# Patient Record
Sex: MALE | URBAN - METROPOLITAN AREA
[De-identification: names, ages, dates, MRNs, and addresses within clinical notes are randomized per-mention and may not be internally consistent; named-entity substitution may affect disease eponyms.]

---

## 2019-05-22 ENCOUNTER — OFFICE VISIT (OUTPATIENT)
Dept: FAMILY MEDICINE CLINIC | Age: 54
End: 2019-05-22

## 2019-05-22 VITALS
SYSTOLIC BLOOD PRESSURE: 106 MMHG | HEART RATE: 94 BPM | HEIGHT: 68 IN | OXYGEN SATURATION: 98 % | RESPIRATION RATE: 18 BRPM | DIASTOLIC BLOOD PRESSURE: 63 MMHG | BODY MASS INDEX: 29.37 KG/M2 | WEIGHT: 193.8 LBS | TEMPERATURE: 97.4 F

## 2019-05-22 DIAGNOSIS — Z13.1 DIABETES MELLITUS SCREENING: ICD-10-CM

## 2019-05-22 DIAGNOSIS — F64.9 GENDER DYSPHORIA: ICD-10-CM

## 2019-05-22 DIAGNOSIS — I10 ESSENTIAL HYPERTENSION: Primary | ICD-10-CM

## 2019-05-22 DIAGNOSIS — E55.9 VITAMIN D DEFICIENCY: ICD-10-CM

## 2019-05-22 DIAGNOSIS — B20 HIV INFECTION, UNSPECIFIED SYMPTOM STATUS (HCC): ICD-10-CM

## 2019-05-22 DIAGNOSIS — Z13.220 LIPID SCREENING: ICD-10-CM

## 2019-05-22 RX ORDER — ESTRADIOL 2 MG/1
TABLET ORAL
Qty: 90 TAB | Refills: 0 | Status: SHIPPED | OUTPATIENT
Start: 2019-05-22

## 2019-05-22 RX ORDER — ESTRADIOL 2 MG/1
TABLET ORAL
Refills: 1 | COMMUNITY
Start: 2019-03-26 | End: 2019-05-22 | Stop reason: SDUPTHER

## 2019-05-22 RX ORDER — LISINOPRIL 40 MG/1
TABLET ORAL
Refills: 1 | COMMUNITY
Start: 2019-02-24 | End: 2019-05-22 | Stop reason: SDUPTHER

## 2019-05-22 RX ORDER — ACETAMINOPHEN 500 MG
TABLET ORAL 2 TIMES DAILY
COMMUNITY

## 2019-05-22 RX ORDER — SPIRONOLACTONE 100 MG/1
TABLET, FILM COATED ORAL
Refills: 1 | COMMUNITY
Start: 2019-02-24 | End: 2019-05-22 | Stop reason: SDUPTHER

## 2019-05-22 RX ORDER — LISINOPRIL 40 MG/1
TABLET ORAL
Qty: 90 TAB | Refills: 1 | Status: SHIPPED | OUTPATIENT
Start: 2019-05-22

## 2019-05-22 RX ORDER — SPIRONOLACTONE 100 MG/1
TABLET, FILM COATED ORAL
Qty: 90 TAB | Refills: 0 | Status: SHIPPED | OUTPATIENT
Start: 2019-05-22 | End: 2019-09-20 | Stop reason: SDUPTHER

## 2019-05-22 NOTE — PROGRESS NOTES
HPI:  52 y.o.  presents as new patient to establish care. She was previously seeing Aly Griffith at [facility] since 2000. She just received Medicaid and was assigned to our practice for PCP. HIV - on therapy since 2000 treated by MADHAV Griffith at [facility]    HTN - compliant with medication, lisinopril 40 mg and spironolactone 100 mg,  no home monitoring. No history of heart disease or stroke. No chest pain, no shortness of breath, no headaches, no lower extremity swelling. Gender dysphoria - biological male, has felt gender dysphoria since age 7 and living as female since age 13, on estradiol 6 mg daily since 1998, may consider gender surgery if can afford it in the future but is currently happy    She hopes to start working at [employer] part-time. She lives with a roommate. She works as an entertainer. She goes to [email or web address]. Smokes 2-3 cigarettes daily. Patient Active Problem List    Diagnosis    HIV infection ([facility]    Vitamin D deficiency    Hypertension    Gender dysphoria         Past Medical History:   Diagnosis Date    Gender dysphoria 1998    [facility] 5/22/2019    Hypertension     Vitamin D deficiency 5/22/2019       Social History     Tobacco Use    Smoking status: Light Tobacco Smoker     Packs/day: 0.25     Years: 15.00     Pack years: 3.75    Smokeless tobacco: Current User    Tobacco comment: vape user   Substance Use Topics    Alcohol use: Not Currently    Drug use: Never       Outpatient Medications Marked as Taking for the 5/22/19 encounter (Office Visit) with Pat Galvan PA-C   Medication Sig Dispense Refill    estradiol (ESTRACE) 2 mg tablet TAKE 3 TABLETS BY MOUTH EVERY DAY  1    lisinopril (PRINIVIL, ZESTRIL) 40 mg tablet TAKE 1 TABLET BY MOUTH EVERY DAY  1    spironolactone (ALDACTONE) 100 mg tablet TAKE 3 TABLETS BY MOUTH DAILY  1    cholecalciferol (VITAMIN D3) 2,000 unit cap capsule Take  by mouth two (2) times a day. No Known Allergies    ROS:  ROS negative except as per HPI. PE:  Visit Vitals  /63 (BP 1 Location: Right arm, BP Patient Position: Sitting)   Pulse 94   Temp 97.4 °F (36.3 °C) (Oral)   Resp 18   Ht 5' 7.8\" (1.722 m)   Wt 193 lb 12.8 oz (87.9 kg)   SpO2 98%   BMI 29.64 kg/m²     Gen: alert, oriented, no acute distress  Head: normocephalic, atraumatic  Ears: external auditory canals clear, TMs without erythema or effusion  Eyes: pupils equal round reactive to light, sclera clear, conjunctiva clear  Oral: moist mucus membranes, no oral lesions, no pharyngeal inflammation or exudate, full dentures  Neck: symmetric normal sized thyroid, no carotid bruits, no lymphadenopathy  Resp: no increase work of breathing, lungs clear to ausculation bilaterally, no wheezing, rales or rhonchi  CV: S1, S2 normal.  No murmurs, rubs, or gallops. Chest wall: breasts present   Abd: soft, not tender, not distended. No hepatosplenomegaly. Normal bowel sounds. Neuro: not focal  Skin: facial hair  Extremities: no cyanosis or edema    No results found for this visit on 05/22/19. Assessment/Plan:      ICD-10-CM ICD-9-CM    1. Essential hypertension - well controlled. Continue current medication. Exercise, and low salt/ low caffeine diet were discussed. fasting labs pending. F/u 3 mos. W42 481.0 METABOLIC PANEL, COMPREHENSIVE      CBC WITH AUTOMATED DIFF      TSH 3RD GENERATION      lisinopril (PRINIVIL, ZESTRIL) 40 mg tablet   2. HIV infection, unspecified symptom status ([facility] - on therapy since 2000, followed at [facility], referral given   B20 V08 REFERRAL TO INFECTIOUS DISEASE   3. Gender dysphoria - on estradiol and spironolactone since 1998, followed by [facility], refills for 1 month and referral given; if unable to continue with [facility], then gave information for the [facility]. F64.9 302.6 estradiol (ESTRACE) 2 mg tablet      spironolactone (ALDACTONE) 100 mg tablet   4.  Vitamin D deficiency - on Vit D3 2000 units BID E55.9 268.9 VITAMIN D, 25 HYDROXY   5. Diabetes mellitus screening Z13.1 V77.1 HEMOGLOBIN A1C WITH EAG   6. Lipid screening Z13.220 V77.91 LIPID PANEL     Health Maintenance reviewed - updated. Medications Discontinued During This Encounter   Medication Reason    estradiol (ESTRACE) 2 mg tablet Reorder    lisinopril (PRINIVIL, ZESTRIL) 40 mg tablet Reorder    spironolactone (ALDACTONE) 100 mg tablet Reorder       Current Outpatient Medications   Medication Sig Dispense Refill    cholecalciferol (VITAMIN D3) 2,000 unit cap capsule Take  by mouth two (2) times a day.  estradiol (ESTRACE) 2 mg tablet TAKE 3 TABLETS BY MOUTH EVERY DAY 90 Tab 0    lisinopril (PRINIVIL, ZESTRIL) 40 mg tablet TAKE 1 TABLET BY MOUTH EVERY DAY 90 Tab 1    spironolactone (ALDACTONE) 100 mg tablet TAKE 3 TABLETS BY MOUTH DAILY 90 Tab 0       Recommended healthy diet low in carbohydrates, fats, sodium and cholesterol. Recommended regular cardiovascular exercise 3-6 times per week for 30-60 minutes daily. Verbal and written instructions (see AVS) provided. Patient expresses understanding of diagnosis and treatment plan. Follow-up and Dispositions    · Return in about 3 months (around 8/22/2019).

## 2019-05-22 NOTE — PATIENT INSTRUCTIONS
Call and Email:  [phone]  [phone]  [email or web address]         DASH Diet: Care Instructions  Your Care Instructions    The DASH diet is an eating plan that can help lower your blood pressure. DASH stands for Dietary Approaches to Stop Hypertension. Hypertension is high blood pressure. The DASH diet focuses on eating foods that are high in calcium, potassium, and magnesium. These nutrients can lower blood pressure. The foods that are highest in these nutrients are fruits, vegetables, low-fat dairy products, nuts, seeds, and legumes. But taking calcium, potassium, and magnesium supplements instead of eating foods that are high in those nutrients does not have the same effect. The DASH diet also includes whole grains, fish, and poultry. The DASH diet is one of several lifestyle changes your doctor may recommend to lower your high blood pressure. Your doctor may also want you to decrease the amount of sodium in your diet. Lowering sodium while following the DASH diet can lower blood pressure even further than just the DASH diet alone. Follow-up care is a key part of your treatment and safety. Be sure to make and go to all appointments, and call your doctor if you are having problems. It's also a good idea to know your test results and keep a list of the medicines you take. How can you care for yourself at home? Following the DASH diet  · Eat 4 to 5 servings of fruit each day. A serving is 1 medium-sized piece of fruit, ½ cup chopped or canned fruit, 1/4 cup dried fruit, or 4 ounces (½ cup) of fruit juice. Choose fruit more often than fruit juice. · Eat 4 to 5 servings of vegetables each day. A serving is 1 cup of lettuce or raw leafy vegetables, ½ cup of chopped or cooked vegetables, or 4 ounces (½ cup) of vegetable juice. Choose vegetables more often than vegetable juice. · Get 2 to 3 servings of low-fat and fat-free dairy each day.  A serving is 8 ounces of milk, 1 cup of yogurt, or 1 ½ ounces of cheese. · Eat 6 to 8 servings of grains each day. A serving is 1 slice of bread, 1 ounce of dry cereal, or ½ cup of cooked rice, pasta, or cooked cereal. Try to choose whole-grain products as much as possible. · Limit lean meat, poultry, and fish to 2 servings each day. A serving is 3 ounces, about the size of a deck of cards. · Eat 4 to 5 servings of nuts, seeds, and legumes (cooked dried beans, lentils, and split peas) each week. A serving is 1/3 cup of nuts, 2 tablespoons of seeds, or ½ cup of cooked beans or peas. · Limit fats and oils to 2 to 3 servings each day. A serving is 1 teaspoon of vegetable oil or 2 tablespoons of salad dressing. · Limit sweets and added sugars to 5 servings or less a week. A serving is 1 tablespoon jelly or jam, ½ cup sorbet, or 1 cup of lemonade. · Eat less than 2,300 milligrams (mg) of sodium a day. If you limit your sodium to 1,500 mg a day, you can lower your blood pressure even more. Tips for success  · Start small. Do not try to make dramatic changes to your diet all at once. You might feel that you are missing out on your favorite foods and then be more likely to not follow the plan. Make small changes, and stick with them. Once those changes become habit, add a few more changes. · Try some of the following:  ? Make it a goal to eat a fruit or vegetable at every meal and at snacks. This will make it easy to get the recommended amount of fruits and vegetables each day. ? Try yogurt topped with fruit and nuts for a snack or healthy dessert. ? Add lettuce, tomato, cucumber, and onion to sandwiches. ? Combine a ready-made pizza crust with low-fat mozzarella cheese and lots of vegetable toppings. Try using tomatoes, squash, spinach, broccoli, carrots, cauliflower, and onions. ? Have a variety of cut-up vegetables with a low-fat dip as an appetizer instead of chips and dip. ? Sprinkle sunflower seeds or chopped almonds over salads.  Or try adding chopped walnuts or almonds to cooked vegetables. ? Try some vegetarian meals using beans and peas. Add garbanzo or kidney beans to salads. Make burritos and tacos with mashed jiang beans or black beans. Where can you learn more? Go to http://francis-keren.info/. Enter C542 in the search box to learn more about \"DASH Diet: Care Instructions. \"  Current as of: July 22, 2018  Content Version: 11.9  © 5436-5689 [employer]. Care instructions adapted under license by [employer] (which disclaims liability or warranty for this information). If you have questions about a medical condition or this instruction, always ask your healthcare professional. [address] any warranty or liability for your use of this information.

## 2019-05-22 NOTE — PROGRESS NOTES
Eugene Acosta  Identified pt with two pt identifiers(name and ). Chief Complaint   Patient presents with   [address]     rm 9/non fasting       1. Have you been to the ER, urgent care clinic since your last visit? no  Hospitalized since your last visit? no    2. Have you seen or consulted any other health care providers outside of the [address] since your last visit? Include any pap smears or colon screening. no      Advance Care Planning    In the event something were to happen to you and you were unable to speak on your behalf, do you have an Advance Directive/ Living Will in place stating your wishes? NO    If yes, do we have a copy on file NO    If no, would you like information NO    Medication reconciliation up to date and corrected with patient at this time. Today's provider has been notified of reason for visit, vitals and flowsheets obtained on patients. Reviewed record in preparation for visit, huddled with provider and have obtained necessary documentation. Health Maintenance Due   Topic    Hepatitis C Screening     DTaP/Tdap/Td series (1 - Tdap)    Shingrix Vaccine Age 50> (1 of 2)    FOBT Q 1 YEAR AGE 50-75        Wt Readings from Last 3 Encounters:   No data found for Wt     Temp Readings from Last 3 Encounters:   No data found for Temp     BP Readings from Last 3 Encounters:   No data found for BP     Pulse Readings from Last 3 Encounters:   No data found for Pulse     There were no vitals filed for this visit. Learning Assessment:  :     No flowsheet data found. Depression Screening:  :     No flowsheet data found. No flowsheet data found. Fall Risk Assessment:  :     No flowsheet data found. Abuse Screening:  :     No flowsheet data found. ADL Screening:  :     No flowsheet data found. BMI:  No flowsheet data found. Medication reconciliation up to date and corrected with patient at this time.

## 2019-05-23 DIAGNOSIS — E55.9 VITAMIN D DEFICIENCY: ICD-10-CM

## 2019-05-23 DIAGNOSIS — Z13.220 LIPID SCREENING: ICD-10-CM

## 2019-05-23 DIAGNOSIS — I10 ESSENTIAL HYPERTENSION: ICD-10-CM

## 2019-05-23 DIAGNOSIS — Z13.1 DIABETES MELLITUS SCREENING: ICD-10-CM

## 2019-06-25 ENCOUNTER — TELEPHONE (OUTPATIENT)
Dept: FAMILY MEDICINE CLINIC | Age: 54
End: 2019-06-25

## 2019-06-25 NOTE — TELEPHONE ENCOUNTER
[facility] sent an alternative request for Estradiol. Insurance only covers max 1 daily. Please call for an override.

## 2019-06-27 NOTE — TELEPHONE ENCOUNTER
[facility] pharmacy is calling to follow up on the request that was sent on the June 25.  Please give [facility] a call back [phone]

## 2019-06-28 NOTE — TELEPHONE ENCOUNTER
Writer called patient regarding medication and referral placed from Pat. Patient did not answer. Writer left  requesting phone call back. Writer also sent patient [employer].

## 2019-09-20 DIAGNOSIS — F64.9 GENDER DYSPHORIA: ICD-10-CM

## 2019-09-23 RX ORDER — SPIRONOLACTONE 100 MG/1
TABLET, FILM COATED ORAL
Qty: 90 TAB | Refills: 0 | Status: SHIPPED | OUTPATIENT
Start: 2019-09-23